# Patient Record
Sex: FEMALE | Race: OTHER | NOT HISPANIC OR LATINO
[De-identification: names, ages, dates, MRNs, and addresses within clinical notes are randomized per-mention and may not be internally consistent; named-entity substitution may affect disease eponyms.]

---

## 2024-05-23 PROBLEM — Z00.00 ENCOUNTER FOR PREVENTIVE HEALTH EXAMINATION: Status: ACTIVE | Noted: 2024-05-23

## 2024-05-27 ENCOUNTER — NON-APPOINTMENT (OUTPATIENT)
Age: 49
End: 2024-05-27

## 2024-05-30 DIAGNOSIS — Z77.22 CONTACT WITH AND (SUSPECTED) EXPOSURE TO ENVIRONMENTAL TOBACCO SMOKE (ACUTE) (CHRONIC): ICD-10-CM

## 2024-05-30 DIAGNOSIS — E03.9 HYPOTHYROIDISM, UNSPECIFIED: ICD-10-CM

## 2024-05-30 DIAGNOSIS — Z78.9 OTHER SPECIFIED HEALTH STATUS: ICD-10-CM

## 2024-05-30 DIAGNOSIS — Z80.0 FAMILY HISTORY OF MALIGNANT NEOPLASM OF DIGESTIVE ORGANS: ICD-10-CM

## 2024-05-30 DIAGNOSIS — Z82.49 FAMILY HISTORY OF ISCHEMIC HEART DISEASE AND OTHER DISEASES OF THE CIRCULATORY SYSTEM: ICD-10-CM

## 2024-05-30 DIAGNOSIS — Z83.3 FAMILY HISTORY OF DIABETES MELLITUS: ICD-10-CM

## 2024-05-30 DIAGNOSIS — Z80.3 FAMILY HISTORY OF MALIGNANT NEOPLASM OF BREAST: ICD-10-CM

## 2024-05-30 RX ORDER — LEVOTHYROXINE SODIUM 75 UG/1
75 CAPSULE ORAL
Refills: 0 | Status: ACTIVE | COMMUNITY

## 2024-06-03 ENCOUNTER — APPOINTMENT (OUTPATIENT)
Dept: SURGERY | Facility: CLINIC | Age: 49
End: 2024-06-03
Payer: MEDICAID

## 2024-06-03 ENCOUNTER — NON-APPOINTMENT (OUTPATIENT)
Age: 49
End: 2024-06-03

## 2024-06-03 VITALS
WEIGHT: 105.2 LBS | SYSTOLIC BLOOD PRESSURE: 123 MMHG | HEIGHT: 59 IN | TEMPERATURE: 98.7 F | BODY MASS INDEX: 21.21 KG/M2 | HEART RATE: 63 BPM | DIASTOLIC BLOOD PRESSURE: 79 MMHG | OXYGEN SATURATION: 98 %

## 2024-06-03 DIAGNOSIS — K80.20 CALCULUS OF GALLBLADDER W/OUT CHOLECYSTITIS W/OUT OBSTRUCTION: ICD-10-CM

## 2024-06-03 PROCEDURE — 99203 OFFICE O/P NEW LOW 30 MIN: CPT

## 2024-06-03 NOTE — PLAN
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  The bouts began when she was on vacation and was eating poorly compared to her normal diet.  The most recent attack was caused by eating Mexican food with cheese.  Because of the symptoms she had an ultrasound done of her abdomen which confirmed that she had small gallstones and an otherwise normal gallbladder.  She is recently had an upper and lower endoscopy which shows mild nonerosive esophagitis.  She has a history of heartburn as well and she takes omeprazole for that.  These new symptoms are completely different than anything she had felt before although she thinks perhaps a year ago she had similar episodes.  She denies jaundice, dark urine, or light stools.  Her past medical history is seen for hypothyroidism and heartburn.  Her past surgical history includes a  section and an umbilical hernia repair with mesh.  Home medications include Synthroid.  She has allergies to azithromycin.  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  Family history is significant for a mother with breast cancer and a father with colon cancer  Review of systems: General-denies fatigue.  Cardiac- denies chest pain.  Respiratory- denies shortness of breath.  GI-denies nausea or vomiting.  -denies dysuria.  Musculoskeletal-denies back pain.  Psychiatric-denies hearing voices.  She denies weight loss.  She denies blood in her stool.  Physical exam: Head-normocephalic.  Sclera are anicteric.  Neck-supple.  Chest-clear.  Abdomen-soft, nontender, nondistended.  Extremities-no edema.  The scar from her umbilical hernia repair is difficult to see.  She may have a very small disc.  Her  section incision is well-healed with no obvious hernia.  There are no labs available for my review  Plan: This a 49-year-old female with abdominal pain squeezing in nature rating to her back associated with Mexican food and while eating on vacation.  She has small layering gallstones and I suspect that she is passing debris into her common bile duct which describes and explains her symptoms.  Obviously cannot be entirely certain but most likely her gallbladder is symptomatic and she would benefit from cholecystectomy.  Patient is agreeable to surgery.  Risk and benefits of surgery have been explained to patient in detail.  These are including but not limited to the possibility of conversion to open.  The possibility of common bile duct injury.  Possibly of cystic duct stump leak.  The possibly of injury to viscera.  Possibly a blood loss requiring blood transfusion.  Possibly a future obstruction.  Possibly future hernia.  In addition there are the usual medical risk associated with anesthesia including but not limited to cardiac, neurologic, pulmonary, and vascular complications including death.  Patient understands these risks and is agreeable to surgery.  Patient will get Ancef prophylaxis prior to incision.  She wishes to go home the same day.  She will be optimized by her primary care provider and will get preop labs including a CBC, c-Met, and a type and screen which is required by hospital policy.

## 2024-08-06 ENCOUNTER — RESULT REVIEW (OUTPATIENT)
Age: 49
End: 2024-08-06

## 2024-08-18 ENCOUNTER — RESULT REVIEW (OUTPATIENT)
Age: 49
End: 2024-08-18

## 2024-08-19 ENCOUNTER — RESULT REVIEW (OUTPATIENT)
Age: 49
End: 2024-08-19

## 2024-08-19 ENCOUNTER — TRANSCRIPTION ENCOUNTER (OUTPATIENT)
Age: 49
End: 2024-08-19

## 2024-08-19 ENCOUNTER — APPOINTMENT (OUTPATIENT)
Dept: SURGERY | Facility: HOSPITAL | Age: 49
End: 2024-08-19

## 2024-08-21 DIAGNOSIS — K81.1 CHRONIC CHOLECYSTITIS: ICD-10-CM

## 2024-09-09 ENCOUNTER — APPOINTMENT (OUTPATIENT)
Dept: SURGERY | Facility: CLINIC | Age: 49
End: 2024-09-09
Payer: MEDICAID

## 2024-09-09 VITALS
OXYGEN SATURATION: 98 % | SYSTOLIC BLOOD PRESSURE: 108 MMHG | TEMPERATURE: 98.1 F | HEART RATE: 66 BPM | DIASTOLIC BLOOD PRESSURE: 74 MMHG

## 2024-09-09 DIAGNOSIS — K81.1 CHRONIC CHOLECYSTITIS: ICD-10-CM

## 2024-09-09 PROCEDURE — 99024 POSTOP FOLLOW-UP VISIT: CPT

## 2024-09-09 NOTE — PLAN
[FreeTextEntry1] : Patient is 3 weeks status post robotic cholecystectomy for presumed symptomatic cholelithiasis.  Ultrasound had confirms stones in the past.  Pathology shows mild chronic cholecystitis but no stones.  Patient overall feels well but still has a pressure sensation in her epigastrium.  She is eating well and has no pain associated with food.  She denies any pain rating to her back.  She denies scleral icterus or dark urine.  On exam her lower robotic incisions are well-healed.  There is no infection or hernia.  She has no tenderness in the upper abdomen.  Plan: It is unclear if all of her preoperative symptoms were related to her gallbladder.  She does have some improvement when she is eating.  I have recommended that she wait a few more weeks and if she still has symptoms she should follow-up with her gastroenterologist.  She is also stopped her heartburn medication and I have recommended that she resume it for the next couple of weeks to see if her symptoms resolve as this could be diagnostic and then if they do she could try to stop them again.  If her symptoms do not resolve then clearly she has a different diagnosis than heartburn or gallbladder.  She will follow me on an as-needed basis.  I described symptoms of retained stone to the patient and she knows to call me or go to the emergency room if she has those symptoms.